# Patient Record
(demographics unavailable — no encounter records)

---

## 2025-04-08 NOTE — PHYSICAL EXAM
[de-identified] :  General: No acute distress Respiratory: Nonlabored Cardiology: Warm and well perfused  Right index finger  + dorsal wrist mass proximal phalanx adjacent to the lateral band No overlying skin changes Mass is soft and mobile Mildly TTP but only deep palpation Full digit range of motion without deficit No numbness or tingling WWP Negative Tinel over the mass  [de-identified] : 3 views of the right index finger were obtained and reviewed in clinic showing no fractures or dislocations just soft tissue swelling dorsally at the proximal

## 2025-04-08 NOTE — ASSESSMENT
[FreeTextEntry1] : 22-year-old female with a right index dorsal ganglion cyst    -Discussed diagnosis, natural history of condition, and treatment options - Discussed observation versus aspiration versus surgical excision of the mass.  Explained that observation is unlikely to resolve the mass but the cyst aspiration is about 50% chance of working.  If the cyst recurs we can consider surgical excision.  Risk benefits and alternatives of surgery such as infection bleeding and stiffness were discussed patient understands.  Patient will return if the mass returns.  All questions answered      I spent 45 minutes in the duration of this encounter which included history, physical exam, discussion with the patient, explaining the diagnosis, image review both with the patient and independently, documentation, and other services; This did not include other separately billed services.

## 2025-04-08 NOTE — PROCEDURE
[FreeTextEntry1] :   Procedure: Injection: After proceeding with informed consent and discussing the risks, benefits, and alternatives to cyst aspiration (infection, recurrence, nerve injury), A 22G needle was then used to aspirate about a few drops of fluid consistent with a ganglion cyst. Manual compression was placed on the mass which fully decompressed it. The patient tolerated the procedure well and there were no complications. Post-injection instructions were discussed with the patient.
[FreeTextEntry1] :   Procedure: Injection: After proceeding with informed consent and discussing the risks, benefits, and alternatives to cyst aspiration (infection, recurrence, nerve injury), A 22G needle was then used to aspirate about a few drops of fluid consistent with a ganglion cyst. Manual compression was placed on the mass which fully decompressed it. The patient tolerated the procedure well and there were no complications. Post-injection instructions were discussed with the patient.
Detail Level: Zone
Render In Strict Bullet Format?: No
Continue Regimen: Efudex cream x 14-21 days on the lower legs.  Per the patient she only did 9-10 days and stopped.  Does not need a refill because she got one from her PCP

## 2025-04-08 NOTE — PHYSICAL EXAM
[de-identified] :  General: No acute distress Respiratory: Nonlabored Cardiology: Warm and well perfused  Right index finger  + dorsal wrist mass proximal phalanx adjacent to the lateral band No overlying skin changes Mass is soft and mobile Mildly TTP but only deep palpation Full digit range of motion without deficit No numbness or tingling WWP Negative Tinel over the mass  [de-identified] : 3 views of the right index finger were obtained and reviewed in clinic showing no fractures or dislocations just soft tissue swelling dorsally at the proximal

## 2025-04-08 NOTE — HISTORY OF PRESENT ILLNESS
[Right] : right hand dominant [FreeTextEntry1] : Right index finger hit a branch while running DOI: March 9th   Ever since his injury he has developed a small mass on the dorsal aspect of the index finger just proximal to the PIP joint.  It is slowly increased in size and is recently been stable.  It is occasionally painful on deep palpation or something makes contact with it.  She denies any numbness or tingling has no deficit in range of motion

## 2025-04-30 NOTE — DISCUSSION/SUMMARY
[de-identified] : right hip contusion with likely underlying RON will send to pt fu after pt if not better

## 2025-04-30 NOTE — PHYSICAL EXAM
[de-identified] : nl gait no assistive device mild ttp lateral mild pain with flexion to 120 IR to 45 without pain er to 45 with mild pain no pain w resisted flexion or abduction nvid [de-identified] : xrays reviewed henrique no fracture

## 2025-04-30 NOTE — HISTORY OF PRESENT ILLNESS
[de-identified] : right hip and thigh pain has been there in past after lots of activities but had fall directly on to is 4/13 went to er and xrays done now returned to mostly baseline except a little sore

## 2025-05-09 NOTE — HISTORY OF PRESENT ILLNESS
[FreeTextEntry1] : Right index finger hit a branch while running DOI: March 9th  Patient returns for repeat evaluation regarding her index finger mass.  Reports that the aspiration lasted for a few weeks and then the mass has slowly started to return.  It is not particularly painful or bothersome but it is present and so she wanted repeat evaluation.  Overall her hand function is without deficit

## 2025-05-09 NOTE — ASSESSMENT
[FreeTextEntry1] : 22-year-old female with a right index finger dorsal ganglion cyst  I again reviewed with the patient treatment options including observation aspiration or surgical excision.  I explained that since aspiration failed to completely resolve the mass a repeat aspiration will not likely help very much.  For this reason to have the lowest chance of mass recurrence I would recommend surgical excision.  However the mass is not inherently dangerous observing the mass is also appropriate.  Patient would not like surgery at this time but potentially later on in the summer.  She will observe her for now.  All questions answered patient agreed with the plan

## 2025-05-09 NOTE — PHYSICAL EXAM
[de-identified] :  General: No acute distress Respiratory: Nonlabored Cardiology: Warm and well perfused  Right index finger  + dorsal  mass proximal phalanx adjacent to the lateral band, Smaller than previous exam No overlying skin changes Mass is soft and mobile Mildly TTP but only deep palpation Full digit range of motion without deficit No numbness or tingling WWP Negative Tinel over the mass